# Patient Record
Sex: FEMALE | Race: ASIAN | NOT HISPANIC OR LATINO | Employment: STUDENT | URBAN - METROPOLITAN AREA
[De-identification: names, ages, dates, MRNs, and addresses within clinical notes are randomized per-mention and may not be internally consistent; named-entity substitution may affect disease eponyms.]

---
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
## 2025-02-05 ENCOUNTER — TELEPHONE (OUTPATIENT)
Dept: ORTHOPEDICS | Facility: CLINIC | Age: 20
End: 2025-02-05

## 2025-02-05 NOTE — TELEPHONE ENCOUNTER
Patients mother (Radha) called stating that patient would like to proceed with MRI due to still having pain. Patient was last seen on 08/27/24 for left hip acetabular labral tear. Informed patient that I would let Dr. Chahal know and she will be contacted to schedule the MRI once the order is put in.     Radha voiced a clear understanding.

## 2025-02-11 DIAGNOSIS — M25.552 PAIN OF LEFT HIP: Primary | ICD-10-CM

## 2025-02-11 DIAGNOSIS — S73.192D TEAR OF LEFT ACETABULAR LABRUM, SUBSEQUENT ENCOUNTER: ICD-10-CM

## 2025-02-11 NOTE — TELEPHONE ENCOUNTER
Contacted patients mother (Radha) and informed her that Dr. Chahal put in the MRI order and she will be contacted to schedule.     Patients mother voiced a clear understanding.

## 2025-02-18 ENCOUNTER — PATIENT MESSAGE (OUTPATIENT)
Dept: ORTHOPEDICS | Facility: CLINIC | Age: 20
End: 2025-02-18
Payer: COMMERCIAL

## 2025-04-03 ENCOUNTER — PATIENT MESSAGE (OUTPATIENT)
Dept: ORTHOPEDICS | Facility: CLINIC | Age: 20
End: 2025-04-03
Payer: COMMERCIAL

## 2025-04-09 ENCOUNTER — DOCUMENTATION ONLY (OUTPATIENT)
Dept: ORTHOPEDICS | Facility: CLINIC | Age: 20
End: 2025-04-09
Payer: COMMERCIAL

## 2025-04-09 NOTE — PROGRESS NOTES
Spoke to patient's mother.  Mya continues to have L hip pain.  MRI reviewed - no signs of labral tear/pathology.  Offered referral to hip  - Dr. Jeffers vs Dr. Love.  She will research and let us know if she wishes a referral.

## 2025-07-24 ENCOUNTER — TELEPHONE (OUTPATIENT)
Dept: INTERNAL MEDICINE | Facility: CLINIC | Age: 20
End: 2025-07-24
Payer: COMMERCIAL

## 2025-07-24 DIAGNOSIS — Z13.29 SCREENING FOR HYPOTHYROIDISM: ICD-10-CM

## 2025-07-24 DIAGNOSIS — E55.9 VITAMIN D DEFICIENCY: ICD-10-CM

## 2025-07-24 DIAGNOSIS — Z00.00 WELLNESS EXAMINATION: Primary | ICD-10-CM

## 2025-07-24 NOTE — TELEPHONE ENCOUNTER
"----- Message from Twan Mccarthy sent at 7/24/2025 10:30 AM CDT -----  Regarding: LAUREL 8/7/25 @ 3:40 Dr. Carson  Please change from "general primary care" to "annual physical"     1. Are there any outstanding tasks in the patient's chart? Yes, Fasting Labs     2. Does patient have home blood pressure cuff?  [ ] Yes  /   [ ] No  (If yes, please have patient bring to appointment for validation.)    3. Remind patient to bring in a list of medications or bottles of all medications including:   A. All Prescription Medications  B. Over-the-Counter Supplements and/or Vitamins  C. Drops (ear and/or eye)  D. Topical Creams  "

## 2025-07-25 ENCOUNTER — TELEPHONE (OUTPATIENT)
Dept: INTERNAL MEDICINE | Facility: CLINIC | Age: 20
End: 2025-07-25
Payer: COMMERCIAL

## 2025-08-05 ENCOUNTER — PATIENT MESSAGE (OUTPATIENT)
Dept: INTERNAL MEDICINE | Facility: CLINIC | Age: 20
End: 2025-08-05
Payer: COMMERCIAL

## 2025-08-06 ENCOUNTER — LAB VISIT (OUTPATIENT)
Dept: LAB | Facility: HOSPITAL | Age: 20
End: 2025-08-06
Attending: INTERNAL MEDICINE
Payer: COMMERCIAL

## 2025-08-06 ENCOUNTER — OFFICE VISIT (OUTPATIENT)
Dept: INTERNAL MEDICINE | Facility: CLINIC | Age: 20
End: 2025-08-06
Payer: COMMERCIAL

## 2025-08-06 VITALS
SYSTOLIC BLOOD PRESSURE: 120 MMHG | BODY MASS INDEX: 23.18 KG/M2 | HEIGHT: 59 IN | HEART RATE: 71 BPM | RESPIRATION RATE: 16 BRPM | OXYGEN SATURATION: 99 % | WEIGHT: 115 LBS | DIASTOLIC BLOOD PRESSURE: 80 MMHG

## 2025-08-06 DIAGNOSIS — Z00.00 WELL ADULT EXAM: Primary | ICD-10-CM

## 2025-08-06 DIAGNOSIS — Q21.12 PFO (PATENT FORAMEN OVALE): ICD-10-CM

## 2025-08-06 DIAGNOSIS — Z13.29 SCREENING FOR HYPOTHYROIDISM: ICD-10-CM

## 2025-08-06 DIAGNOSIS — E55.9 VITAMIN D DEFICIENCY: ICD-10-CM

## 2025-08-06 DIAGNOSIS — Z00.00 WELLNESS EXAMINATION: ICD-10-CM

## 2025-08-06 PROBLEM — R01.1 MURMUR, CARDIAC: Status: RESOLVED | Noted: 2024-08-01 | Resolved: 2025-08-06

## 2025-08-06 PROBLEM — B35.9 TINEA: Status: RESOLVED | Noted: 2024-08-01 | Resolved: 2025-08-06

## 2025-08-06 PROBLEM — T14.8XXA MUSCULOSKELETAL STRAIN: Status: RESOLVED | Noted: 2024-08-27 | Resolved: 2025-08-06

## 2025-08-06 LAB
25(OH)D3+25(OH)D2 SERPL-MCNC: 22 NG/ML (ref 30–80)
ALBUMIN SERPL-MCNC: 4.1 G/DL (ref 3.5–5)
ALBUMIN/GLOB SERPL: 1.5 RATIO (ref 1.1–2)
ALP SERPL-CCNC: 59 UNIT/L (ref 40–150)
ALT SERPL-CCNC: 14 UNIT/L (ref 0–55)
ANION GAP SERPL CALC-SCNC: 4 MEQ/L
AST SERPL-CCNC: 17 UNIT/L (ref 11–45)
BACTERIA #/AREA URNS AUTO: ABNORMAL /HPF
BASOPHILS # BLD AUTO: 0.02 X10(3)/MCL
BASOPHILS NFR BLD AUTO: 0.4 %
BILIRUB SERPL-MCNC: 0.4 MG/DL
BILIRUB UR QL STRIP.AUTO: NEGATIVE
BUN SERPL-MCNC: 11.7 MG/DL (ref 7–18.7)
CALCIUM SERPL-MCNC: 9.4 MG/DL (ref 8.4–10.2)
CHLORIDE SERPL-SCNC: 107 MMOL/L (ref 98–107)
CHOLEST SERPL-MCNC: 142 MG/DL
CHOLEST/HDLC SERPL: 2 {RATIO} (ref 0–5)
CLARITY UR: CLEAR
CO2 SERPL-SCNC: 28 MMOL/L (ref 22–29)
COLOR UR AUTO: ABNORMAL
CREAT SERPL-MCNC: 0.75 MG/DL (ref 0.55–1.02)
CREAT/UREA NIT SERPL: 16
EOSINOPHIL # BLD AUTO: 0.12 X10(3)/MCL (ref 0–0.9)
EOSINOPHIL NFR BLD AUTO: 2.2 %
ERYTHROCYTE [DISTWIDTH] IN BLOOD BY AUTOMATED COUNT: 13.2 % (ref 11.5–17)
EST. AVERAGE GLUCOSE BLD GHB EST-MCNC: 105.4 MG/DL
GFR SERPLBLD CREATININE-BSD FMLA CKD-EPI: >60 ML/MIN/1.73/M2
GLOBULIN SER-MCNC: 2.8 GM/DL (ref 2.4–3.5)
GLUCOSE SERPL-MCNC: 95 MG/DL (ref 74–100)
GLUCOSE UR QL STRIP: NORMAL
HBA1C MFR BLD: 5.3 %
HCT VFR BLD AUTO: 37.2 % (ref 37–47)
HDLC SERPL-MCNC: 62 MG/DL (ref 35–60)
HGB BLD-MCNC: 11.6 G/DL (ref 12–16)
HGB UR QL STRIP: NEGATIVE
IMM GRANULOCYTES # BLD AUTO: 0.02 X10(3)/MCL (ref 0–0.04)
IMM GRANULOCYTES NFR BLD AUTO: 0.4 %
KETONES UR QL STRIP: NEGATIVE
LDLC SERPL CALC-MCNC: 74 MG/DL (ref 50–140)
LEUKOCYTE ESTERASE UR QL STRIP: NEGATIVE
LYMPHOCYTES # BLD AUTO: 2.01 X10(3)/MCL (ref 0.6–4.6)
LYMPHOCYTES NFR BLD AUTO: 36.9 %
MCH RBC QN AUTO: 29.3 PG (ref 27–31)
MCHC RBC AUTO-ENTMCNC: 31.2 G/DL (ref 33–36)
MCV RBC AUTO: 93.9 FL (ref 80–94)
MONOCYTES # BLD AUTO: 0.46 X10(3)/MCL (ref 0.1–1.3)
MONOCYTES NFR BLD AUTO: 8.5 %
MUCOUS THREADS URNS QL MICRO: ABNORMAL /LPF
NEUTROPHILS # BLD AUTO: 2.81 X10(3)/MCL (ref 2.1–9.2)
NEUTROPHILS NFR BLD AUTO: 51.6 %
NITRITE UR QL STRIP: NEGATIVE
NRBC BLD AUTO-RTO: 0 %
PH UR STRIP: 5.5 [PH]
PLATELET # BLD AUTO: 210 X10(3)/MCL (ref 130–400)
PMV BLD AUTO: 9.3 FL (ref 7.4–10.4)
POTASSIUM SERPL-SCNC: 4 MMOL/L (ref 3.5–5.1)
PROT SERPL-MCNC: 6.9 GM/DL (ref 6.4–8.3)
PROT UR QL STRIP: NEGATIVE
RBC # BLD AUTO: 3.96 X10(6)/MCL (ref 4.2–5.4)
RBC #/AREA URNS AUTO: ABNORMAL /HPF
SODIUM SERPL-SCNC: 139 MMOL/L (ref 136–145)
SP GR UR STRIP.AUTO: 1.03 (ref 1–1.03)
SQUAMOUS #/AREA URNS LPF: ABNORMAL /HPF
TRIGL SERPL-MCNC: 31 MG/DL (ref 37–140)
TSH SERPL-ACNC: 2.74 UIU/ML (ref 0.35–4.94)
UROBILINOGEN UR STRIP-ACNC: NORMAL
VLDLC SERPL CALC-MCNC: 6 MG/DL
WBC # BLD AUTO: 5.44 X10(3)/MCL (ref 4.5–11.5)
WBC #/AREA URNS AUTO: ABNORMAL /HPF

## 2025-08-06 PROCEDURE — 80061 LIPID PANEL: CPT

## 2025-08-06 PROCEDURE — 3079F DIAST BP 80-89 MM HG: CPT | Mod: CPTII,,, | Performed by: INTERNAL MEDICINE

## 2025-08-06 PROCEDURE — 81001 URINALYSIS AUTO W/SCOPE: CPT

## 2025-08-06 PROCEDURE — 3044F HG A1C LEVEL LT 7.0%: CPT | Mod: CPTII,,, | Performed by: INTERNAL MEDICINE

## 2025-08-06 PROCEDURE — 80053 COMPREHEN METABOLIC PANEL: CPT

## 2025-08-06 PROCEDURE — 83036 HEMOGLOBIN GLYCOSYLATED A1C: CPT

## 2025-08-06 PROCEDURE — 84443 ASSAY THYROID STIM HORMONE: CPT

## 2025-08-06 PROCEDURE — 1159F MED LIST DOCD IN RCRD: CPT | Mod: CPTII,,, | Performed by: INTERNAL MEDICINE

## 2025-08-06 PROCEDURE — 82306 VITAMIN D 25 HYDROXY: CPT

## 2025-08-06 PROCEDURE — 3074F SYST BP LT 130 MM HG: CPT | Mod: CPTII,,, | Performed by: INTERNAL MEDICINE

## 2025-08-06 PROCEDURE — 3008F BODY MASS INDEX DOCD: CPT | Mod: CPTII,,, | Performed by: INTERNAL MEDICINE

## 2025-08-06 PROCEDURE — 36415 COLL VENOUS BLD VENIPUNCTURE: CPT

## 2025-08-06 PROCEDURE — 99395 PREV VISIT EST AGE 18-39: CPT | Mod: ,,, | Performed by: INTERNAL MEDICINE

## 2025-08-06 PROCEDURE — 85025 COMPLETE CBC W/AUTO DIFF WBC: CPT

## 2025-08-06 NOTE — PROGRESS NOTES
"  Internal Medicine    Patient ID: [id number]     Chief Complaint: Annual Exam      HPI:     Mya Li is a 19 y.o. female here today for a follow up.   PFO- followed by Romy; last ECHO 2020  Being followed by Sergey  Psychology major at   Not sexually active  No GYN    She discusses sleep issues, primarily difficulty falling asleep. She reports going to bed around 10 PM but not feeling tired at that time. She often delays bedtime and takes showers late at night, which may contribute to her sleep problems.      Regarding her skin, she is about to start spironolactone treatment following previous courses of doxycycline and minocycline for acne. She delayed starting the new medication due to an upcoming beach trip, considering the potential for sun sensitivity.    She reports feeling a small, tender lump under her left axilla recently, which she believes might be related to her menstrual cycle.    She denies fever, chills, night sweats, and chest pain. She denies any cardiac issues related to her exercise intolerance.    TEST RESULTS:  Patient's triglycerides level is 31. Her glucose and other labs are within normal limits.    IMAGING:  An MRI of the left hip with IV contrast was performed, with results described as "Unremarkable appearance of the left hip". An echocardiogram in 2022 showed no evidence of pulmonary hypertension or enlarged atria.      Social History     Tobacco Use    Smoking status: Never    Smokeless tobacco: Never   Substance and Sexual Activity    Alcohol use: Never    Drug use: Never    Sexual activity: Never        Current Outpatient Medications   Medication Instructions    terbinafine HCL (LAMISIL) 1 % cream Topical (Top), 2 times daily       Review of patient's allergies indicates:  No Known Allergies     Patient Care Team:  Mingo Aguirre MD as PCP - General (Internal Medicine)     Subjective:     Review of Systems    12 point review of systems conducted, negative except as " "stated in the history of present illness. See HPI for details.    Objective:     Visit Vitals  /80 (BP Location: Right arm, Patient Position: Sitting)   Pulse 71   Resp 16   Ht 4' 11" (1.499 m)   Wt 52.2 kg (115 lb)   SpO2 99%   BMI 23.23 kg/m²       Physical Exam  Constitutional:       Appearance: Normal appearance.   HENT:      Head: Normocephalic and atraumatic.   Eyes:      Extraocular Movements: Extraocular movements intact.      Pupils: Pupils are equal, round, and reactive to light.   Cardiovascular:      Rate and Rhythm: Normal rate and regular rhythm.   Pulmonary:      Effort: Pulmonary effort is normal.      Breath sounds: Normal breath sounds.   Skin:     General: Skin is warm and dry.      Comments: No axillary adenopathy on the left, no breast masses appreciated to the left outer quadrants   Neurological:      General: No focal deficit present.      Mental Status: She is alert.   Psychiatric:         Mood and Affect: Mood normal.       Physical Exam              Labs Reviewed:     Chemistry:  Lab Results   Component Value Date     08/06/2025    K 4.0 08/06/2025    BUN 11.7 08/06/2025    CREATININE 0.75 08/06/2025    EGFRNORACEVR >60 08/06/2025    CALCIUM 9.4 08/06/2025    ALKPHOS 59 08/06/2025    ALBUMIN 4.1 08/06/2025    AST 17 08/06/2025    ALT 14 08/06/2025    [id number] 22 (L) 08/06/2025    TSH 2.741 08/06/2025        Lab Results   Component Value Date    HGBA1C 5.3 08/06/2025        Hematology:  Lab Results   Component Value Date    WBC 5.44 08/06/2025    HGB 11.6 (L) 08/06/2025    HCT 37.2 08/06/2025     08/06/2025       Lipid Panel:  Lab Results   Component Value Date    CHOL 142 08/06/2025    HDL 62 (H) 08/06/2025    TRIG 31 (L) 08/06/2025    TOTALCHOLEST 2 08/06/2025        Urine:  Lab Results   Component Value Date    APPEARANCEUA Clear 08/06/2025    SGUA 1.027 08/06/2025    PROTEINUA Negative 08/06/2025    KETONESUA Negative 08/06/2025    LEUKOCYTESUR Negative 08/06/2025 "    RBCUA None Seen 08/06/2025    WBCUA 0-5 08/06/2025    BACTERIA Moderate (A) 08/06/2025    SQEPUA Trace 08/06/2025        Assessment and Plan:     Assessment & Plan    Q21.12 PFO (patent foramen ovale)  Z00.00 Well adult exam      Q21.12 PFO (PATENT FORAMEN OVALE):  - Followed by Romy    Z00.00 WELL ADULT EXAM:  - Referred the patient to gynecologist for baseline exam.  - age-appropriate exams are up-to-date  - okay for magnesium or melatonin for sleep  No follow-ups on file. In addition to their scheduled follow up, the patient has also been instructed to follow up on as needed basis.     Future Appointments   Date Time Provider Department Center   8/10/2026  3:20 PM Mingo Aguirre MD [place] [id number]        Mingo Aguirre MD   This note was generated with the assistance of ambient listening technology. Verbal consent was obtained by the patient and accompanying visitor(s) for the recording of patient appointment to facilitate this note. I attest to having reviewed and edited the generated note for accuracy, though some syntax or spelling errors may persist. Please contact the author of this note for any clarification.